# Patient Record
Sex: FEMALE | Race: OTHER | NOT HISPANIC OR LATINO | URBAN - METROPOLITAN AREA
[De-identification: names, ages, dates, MRNs, and addresses within clinical notes are randomized per-mention and may not be internally consistent; named-entity substitution may affect disease eponyms.]

---

## 2018-12-03 ENCOUNTER — EMERGENCY (EMERGENCY)
Facility: HOSPITAL | Age: 28
LOS: 1 days | Discharge: HOME | End: 2018-12-03
Attending: EMERGENCY MEDICINE | Admitting: EMERGENCY MEDICINE
Payer: OTHER MISCELLANEOUS

## 2018-12-03 VITALS
SYSTOLIC BLOOD PRESSURE: 118 MMHG | OXYGEN SATURATION: 95 % | TEMPERATURE: 99 F | RESPIRATION RATE: 18 BRPM | HEART RATE: 88 BPM | DIASTOLIC BLOOD PRESSURE: 76 MMHG

## 2018-12-03 DIAGNOSIS — Z88.0 ALLERGY STATUS TO PENICILLIN: ICD-10-CM
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
## 2018-12-03 DIAGNOSIS — M25.561 PAIN IN RIGHT KNEE: ICD-10-CM
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
## 2018-12-03 PROCEDURE — 99283 EMERGENCY DEPT VISIT LOW MDM: CPT

## 2018-12-03 RX ORDER — IBUPROFEN 200 MG
600 TABLET ORAL ONCE
Qty: 0 | Refills: 0 | Status: COMPLETED | OUTPATIENT
Start: 2018-12-03 | End: 2018-12-03

## 2018-12-03 RX ADMIN — Medication 600 MILLIGRAM(S): at 19:55

## 2018-12-03 NOTE — ED ADULT NURSE NOTE - RESPIRATORY ASSESSMENT
Dr. Keating's phones are not in service. Patient would like to  copy of the referral prior to his visit this afternoon.    WDL

## 2018-12-03 NOTE — ED ADULT NURSE NOTE - CHPI ED NUR SYMPTOMS NEG
no numbness/no weakness/no tingling/no back pain/no bruising/no fever/no deformity/no difficulty bearing weight/no stiffness/no abrasion

## 2018-12-03 NOTE — ED PROVIDER NOTE - PHYSICAL EXAMINATION
GEN: Well appearing, well nourished, awake, alert, oriented to person, place, time/situation and in no apparent distress.  ENT: Airway patent, Nasal mucosa clear. Mouth with normal mucosa.  EYES: Clear bilaterally.  RESPIRATORY: Breathing comfortably with normal RR.  MSK: mild edema of right knee, ROM fully intact. mild tender to medial aspect of joint  NEURO: Alert and oriented, no focal deficits. Neurovascularly intact  SKIN: Skin normal color for race, warm, dry and intact. No evidence of rash.  PSYCH: Alert and oriented to person, place, time/situation. normal mood and affect. no apparent risk to self or others. GEN: Well appearing, well nourished, awake, alert, oriented to person, place, time/situation and in no apparent distress.  ENT: Airway patent, Nasal mucosa clear. Mouth with normal mucosa.  EYES: Clear bilaterally.  RESPIRATORY: Breathing comfortably with normal RR.  MSK: mild edema of right knee, ROM fully intact. mild tender to medial aspect of joint.  Patella tendon completely intact.    NEURO: Alert and oriented, no focal deficits. Neurovascularly intact  SKIN: Skin normal color for race, warm, dry and intact. No evidence of rash.  PSYCH: Alert and oriented to person, place, time/situation. normal mood and affect. no apparent risk to self or others.

## 2018-12-03 NOTE — ED PROVIDER NOTE - NSFOLLOWUPINSTRUCTIONS_ED_ALL_ED_FT
Please ice area of pain for the next 2 days.  Ice for 20 minutes at a time at least 3 times a day.    PLEASE FOLLOW-UP IN OUR ORTHOPEDIC CLINIC.  THE CONTACT INFO AND REFERRAL ARE ABOVE.

## 2018-12-03 NOTE — ED PROVIDER NOTE - MEDICAL DECISION MAKING DETAILS
L knee pain.  Likely exacerbation of meniscal injury.  Pt currently trying to get an out pt MRI.  Will give pain control.  Ice.  Crutches.  Will have her f/u with out orthopedic clinic for re-evaluation and to hopefully expedite her MRI. L knee pain.  Likely exacerbation of meniscal injury.  No direct trauma so will not repeat xray at this time.  Pt currently trying to get an out pt MRI.  Will give pain control.  Ice.  Crutches.  Will have her f/u with out orthopedic clinic for re-evaluation and to hopefully expedite her MRI (pt requesting a new orthopedist).

## 2018-12-03 NOTE — ED ADULT NURSE NOTE - NSIMPLEMENTINTERV_GEN_ALL_ED
Implemented All Universal Safety Interventions:  [place] to call system. Call bell, personal items and telephone within reach. Instruct patient to call for assistance. Room bathroom lighting operational. Non-slip footwear when patient is off stretcher. Physically safe environment: no spills, clutter or unnecessary equipment. Stretcher in lowest position, wheels locked, appropriate side rails in place.

## 2018-12-03 NOTE — ED PROVIDER NOTE - CARE PROVIDER_API CALL
Floyd Brewer), Orthopaedic Surgery  [address]  [address] [place] [address]  Phone: [phone]  Fax: [phone]

## 2018-12-04 PROBLEM — Z00.00 ENCOUNTER FOR PREVENTIVE HEALTH EXAMINATION: Status: ACTIVE | Noted: 2018-12-04

## 2018-12-06 ENCOUNTER — FORM ENCOUNTER (OUTPATIENT)
Age: 28
End: 2018-12-06

## 2018-12-07 ENCOUNTER — APPOINTMENT (OUTPATIENT)
Dept: ORTHOPEDIC SURGERY | Facility: CLINIC | Age: 28
End: 2018-12-07
Payer: OTHER MISCELLANEOUS

## 2018-12-07 ENCOUNTER — OUTPATIENT (OUTPATIENT)
Dept: OUTPATIENT SERVICES | Facility: HOSPITAL | Age: 28
LOS: 1 days | End: 2018-12-07

## 2018-12-07 ENCOUNTER — APPOINTMENT (OUTPATIENT)
Dept: RADIOLOGY | Facility: CLINIC | Age: 28
End: 2018-12-07
Payer: OTHER MISCELLANEOUS

## 2018-12-07 VITALS — HEIGHT: 72 IN | RESPIRATION RATE: 16 BRPM | WEIGHT: 194 LBS | BODY MASS INDEX: 26.28 KG/M2

## 2018-12-07 DIAGNOSIS — Z80.9 FAMILY HISTORY OF MALIGNANT NEOPLASM, UNSPECIFIED: ICD-10-CM

## 2018-12-07 DIAGNOSIS — M23.91 UNSPECIFIED INTERNAL DERANGEMENT OF RIGHT KNEE: ICD-10-CM

## 2018-12-07 DIAGNOSIS — M24.20 DISORDER OF LIGAMENT, UNSPECIFIED SITE: ICD-10-CM

## 2018-12-07 DIAGNOSIS — M25.361 OTHER INSTABILITY, RIGHT KNEE: ICD-10-CM

## 2018-12-07 PROCEDURE — 73562 X-RAY EXAM OF KNEE 3: CPT | Mod: 26,RT

## 2018-12-07 PROCEDURE — 99204 OFFICE O/P NEW MOD 45 MIN: CPT

## 2018-12-07 RX ORDER — NAPROXEN SODIUM 220 MG
TABLET ORAL
Refills: 0 | Status: ACTIVE | COMMUNITY

## 2018-12-07 RX ORDER — IBUPROFEN 200 MG/1
TABLET, COATED ORAL
Refills: 0 | Status: ACTIVE | COMMUNITY

## 2025-02-26 NOTE — ED PROVIDER NOTE - CARE PLAN
LVM requesting call back.       Rx sent  BMP order placed.    Principal Discharge DX:	Right knee pain, unspecified chronicity

## 2025-05-12 ENCOUNTER — EMERGENCY (EMERGENCY)
Facility: HOSPITAL | Age: 35
LOS: 1 days | End: 2025-05-12
Admitting: STUDENT IN AN ORGANIZED HEALTH CARE EDUCATION/TRAINING PROGRAM
Payer: MEDICAID

## 2025-05-12 VITALS
SYSTOLIC BLOOD PRESSURE: 115 MMHG | TEMPERATURE: 98 F | DIASTOLIC BLOOD PRESSURE: 80 MMHG | HEART RATE: 97 BPM | WEIGHT: 218.04 LBS | OXYGEN SATURATION: 98 % | RESPIRATION RATE: 17 BRPM

## 2025-05-12 PROCEDURE — 99283 EMERGENCY DEPT VISIT LOW MDM: CPT | Mod: 25

## 2025-05-12 PROCEDURE — 28470 CLTX METATARSAL FX WO MNP EA: CPT | Mod: LT

## 2025-05-12 PROCEDURE — 73630 X-RAY EXAM OF FOOT: CPT | Mod: 26,LT

## 2025-05-12 PROCEDURE — 28470 CLTX METATARSAL FX WO MNP EA: CPT | Mod: 54,LT

## 2025-05-12 PROCEDURE — 99285 EMERGENCY DEPT VISIT HI MDM: CPT | Mod: 57

## 2025-05-12 PROCEDURE — 73630 X-RAY EXAM OF FOOT: CPT

## 2025-05-12 RX ORDER — IBUPROFEN 200 MG
400 TABLET ORAL ONCE
Refills: 0 | Status: COMPLETED | OUTPATIENT
Start: 2025-05-12 | End: 2025-05-12

## 2025-05-12 RX ADMIN — Medication 400 MILLIGRAM(S): at 18:29

## 2025-05-12 NOTE — ED PROVIDER NOTE - CARE PROVIDER_API CALL
Alfred Vaughn  Podiatric Medicine and Surgery  [address] [facility] [address]  [place] [address]  Phone: [phone]  Fax: [phone]  Follow Up Time:

## 2025-05-12 NOTE — ED PROVIDER NOTE - MUSCULOSKELETAL, MLM
left foot +tenderness to mid dorsum of foot, +mild swelling, no deformity, skin intact, DP/PT 2+, strength 5/5, sensation intact distally

## 2025-05-12 NOTE — ED ADULT NURSE NOTE - NSFALLUNIVINTERV_ED_ALL_ED
Bed/Stretcher in lowest position, wheels locked, appropriate side rails in place/Call bell, personal items and telephone in reach/Instruct patient to call for assistance before getting out of bed/chair/stretcher/Non-slip footwear applied when patient is off stretcher/[place] to call system/Physically safe environment - no spills, clutter or unnecessary equipment/Purposeful proactive rounding/Room/bathroom lighting operational, light cord in reach

## 2025-05-12 NOTE — ED PROVIDER NOTE - OBJECTIVE STATEMENT
33 y/o f presents c/o left foot pain.  Pt stating last week she felt a "pop" while walking and has been having pain and mild swelling since.  Pt stating she took 200mg ibuprofen earlier today with some improvement, has been ambulating on the injured foot.  Denies numbness/tingling to ext, all other ROS negative.

## 2025-05-12 NOTE — ED ADULT TRIAGE NOTE - CHIEF COMPLAINT QUOTE
pt c/o left foot pain since last Thursday. pt stated " she was walking and heard a pop, was waiting for it to go away, but the same thing happened today" took Ibuprofen at 3pm

## 2025-05-12 NOTE — ED PROVIDER NOTE - CLINICAL SUMMARY MEDICAL DECISION MAKING FREE TEXT BOX
35 y/o f presents c/o left foot pain after hearing a pop while walking last week.  Ext NVI, pt ambulatory in ED, xray shows mid 3rd metatarsal fracture,  Discussed with podiatry who recommends posterior splint, nonweight bearing with crutches, f/u podiatry.  Pt put in posterior splint in ED, given crutches, recommend ice, elevate, f/u podiatry

## 2025-05-12 NOTE — ED PROVIDER NOTE - NSFOLLOWUPINSTRUCTIONS_ED_ALL_ED_FT
Podiatry Clinic  Dr. Kleiner  [address]  Tuesdays 9am-12pm  [phone]    Metatarsal Fracture  Bones of the ankle and foot, showing the metatarsal bones.  A metatarsal fracture is a break in one of the five bones that connect the toes to the rest of the foot. This may also be called a forefoot fracture. A metatarsal fracture may be:  A crack in the surface of the bone (stress fracture). This often occurs in athletes.  A break all the way through the bone (complete fracture).  The bone that connects to the little toe (fifth metatarsal) is most commonly fractured. Ballet dancers and other athletes often fracture this bone.    What are the causes?  A metatarsal fracture may be caused by:  Sudden twisting of the foot.  Falling onto the foot.  Something heavy falling onto the foot.  Overuse or repeated exercise.  What increases the risk?  This condition is more likely to develop in people who:  Play contact sports.  Are runners.  Do ballet.  Have weak bones (osteoporosis).  Have low calcium levels.  What are the signs or symptoms?  Symptoms of this condition include:  Pain that gets worse when walking or standing.  Pain when pressing on the foot or moving the toes.  Swelling.  Bruising on the top or bottom of the foot.  How is this diagnosed?  This condition may be diagnosed based on:  Your symptoms.  Any recent foot injuries you have had.  A physical exam.  An X-ray of your foot. If you have a stress fracture, it may not show up on an X-ray. You may need other imaging tests, such as:  A bone scan.  CT scan.  MRI.  How is this treated?  Treatment depends on how severe your fracture is and how the pieces of the broken bone line up with each other (alignment). Treatment may involve:  Wearing a cast, splint, or supportive boot on your foot.  Using crutches and notputting any weight on your foot.  Having surgery to align broken bones and hold them in place while they heal (open reduction and internal fixation or percutaneous pinning).  Physical therapy exercises to improve movement and strength in your foot.  Follow these instructions at home:  If you have a removable splint or boot:    Wear the splint or boot as told by your health care provider. Remove it only as told by your provider.  Check the skin around the splint or boot every day. Tell your provider about any concerns.  Loosen the splint or boot if your toes tingle, become numb, or turn cold and blue.  Keep the splint or boot clean and dry.  If you have a nonremovable cast:    Do not put pressure on any part of the cast until it is fully hardened. This may take several hours.  Do not stick anything inside the cast to scratch your skin. Doing that increases your risk of infection.  Check the skin around the cast every day. Tell your provider about any concerns.  You may put lotion on dry skin around the edges of the cast. Do not put lotion on the skin underneath the cast.  Keep the cast clean and dry.  Bathing    If the cast, splint, or boot is not waterproof:  Do not let it get wet.  Cover it with a watertight covering when you take a bath or shower.  Activity    Do not use your affected leg to support your body weight until your provider says that you can. Use crutches as told by your provider.  Ask your provider what activities are safe for you during recovery. Ask what activities you need to avoid.  Do exercises as told by your provider.  Driving    Ask your provider if the medicine prescribed to you requires you to avoid driving or using machinery.  Do not drive while wearing a cast, splint, or boot on a foot that you use for driving.  Managing pain, stiffness, and swelling    A bag of ice on a towel on the skin.  If told, put ice on the painful area.  If you have a removable splint or boot, remove it as told by your provider.  Put ice in a plastic bag.  Place a towel between your skin and the bag or between your cast and the bag.  Leave the ice on for 20 minutes, 2–3 times a day.  If your skin turns bright red, remove the ice right away to prevent skin damage. The risk of damage is higher if you cannot feel pain, heat, or cold.  Move your toes often to reduce stiffness and swelling.  Raise (elevate) your lower leg above the level of your heart while you are sitting or lying down.  General instructions    Take over-the-counter and prescription medicines only as told by your provider.  Do not use any products that contain nicotine or tobacco. These products include cigarettes, chewing tobacco, and vaping devices, such as e-cigarettes. These can delay bone healing. If you need help quitting, ask your provider.  Do not take baths, swim, or use a hot tub until your provider approves. Ask your provider if you may take showers.  Keep all follow-up visits. Your provider will check to see how you are healing. This may include more X-rays.  Contact a health care provider if:  You have pain that gets worse or does not improve with medicine.  You have a fever.  You have a bad smell coming from your cast or splint or if the cast or splint gets wet.  Get help right away if:  You have any of the following in your toes or foot, even after loosening your splint (if you have one):  Numbness.  Tingling.  Coldness.  Blue skin.  Redness or swelling that gets worse.  You have pain that suddenly becomes severe.  This information is not intended to replace advice given to you by your health care provider. Make sure you discuss any questions you have with your health care provider.

## 2025-05-12 NOTE — ED PROVIDER NOTE - ADDITIONAL NOTES AND INSTRUCTIONS:
Patient has fracture to left foot, will be in splint and crutches and not able to bear weight on that leg for next 4 weeks, please accommodate.

## 2025-05-12 NOTE — ED PROVIDER NOTE - PATIENT PORTAL LINK FT
You can access the FollowMyHealth Patient Portal offered by [facility] by registering at the following website: [email or web address]. By joining [facility]’s FollowMyHealth portal, you will also be able to view your health information using other applications (apps) compatible with our system.

## 2025-05-12 NOTE — ED ADULT NURSE NOTE - OBJECTIVE STATEMENT
33 y/o female presents to the ED c/o L foot pain since Thursday. Denies CP, SOB, HA, F/C, N/V/D, weakness, dizziness, and any other complaints at this time. On exam A&Ox4, RA, ambulatory, NAD. Speaking in clear coherent sentences, respirations are spontaneous and unlabored. No obvious signs of injury, MILLER.

## 2025-05-15 DIAGNOSIS — S92.332A DISPLACED FRACTURE OF THIRD METATARSAL BONE, LEFT FOOT, INITIAL ENCOUNTER FOR CLOSED FRACTURE: ICD-10-CM

## 2025-05-15 DIAGNOSIS — M79.672 PAIN IN LEFT FOOT: ICD-10-CM
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
## 2025-05-15 DIAGNOSIS — Z88.0 ALLERGY STATUS TO PENICILLIN: ICD-10-CM
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
